# Patient Record
Sex: FEMALE | Race: OTHER | HISPANIC OR LATINO
[De-identification: names, ages, dates, MRNs, and addresses within clinical notes are randomized per-mention and may not be internally consistent; named-entity substitution may affect disease eponyms.]

---

## 2020-09-03 ENCOUNTER — RESULT CHARGE (OUTPATIENT)
Age: 35
End: 2020-09-03

## 2020-09-03 ENCOUNTER — APPOINTMENT (OUTPATIENT)
Dept: OBGYN | Facility: HOSPITAL | Age: 35
End: 2020-09-03
Payer: COMMERCIAL

## 2020-09-03 ENCOUNTER — RESULT REVIEW (OUTPATIENT)
Age: 35
End: 2020-09-03

## 2020-09-03 ENCOUNTER — OUTPATIENT (OUTPATIENT)
Dept: OUTPATIENT SERVICES | Facility: HOSPITAL | Age: 35
LOS: 1 days | End: 2020-09-03
Payer: MEDICAID

## 2020-09-03 VITALS
HEIGHT: 67 IN | WEIGHT: 175 LBS | SYSTOLIC BLOOD PRESSURE: 130 MMHG | HEART RATE: 125 BPM | BODY MASS INDEX: 27.47 KG/M2 | TEMPERATURE: 98.1 F | DIASTOLIC BLOOD PRESSURE: 72 MMHG

## 2020-09-03 DIAGNOSIS — R87.619 UNSPECIFIED ABNORMAL CYTOLOGICAL FINDINGS IN SPECIMENS FROM CERVIX UTERI: ICD-10-CM

## 2020-09-03 DIAGNOSIS — Z00.00 ENCOUNTER FOR GENERAL ADULT MEDICAL EXAMINATION W/OUT ABNORMAL FINDINGS: ICD-10-CM

## 2020-09-03 LAB
HCG UR QL: NEGATIVE
QUALITY CONTROL: YES

## 2020-09-03 PROCEDURE — 57454 BX/CURETT OF CERVIX W/SCOPE: CPT | Mod: GC

## 2020-09-03 PROCEDURE — 88305 TISSUE EXAM BY PATHOLOGIST: CPT | Mod: 26

## 2020-09-03 NOTE — PROCEDURE
[Colposcopy] : colposcopy [ASCUS] : atypical squamous cells of undetermined significance [HPV high risk] : PCR positive for high risk HPV [Patient] : patient [Risks] : risks [Alternatives] : alternatives [Infection] : infection [Benefits] : benefits [Bleeding] : bleeding [Consent Obtained] : written consent was obtained prior to the procedure [Acetowhite ___ o'clock] : ascetowhite changes at [unfilled] ~Uo'clock [Biopsies Taken: # ___] : [unfilled] biopsies taken of the cervix [No Abnormalities] : no abnormalities seen [Zoran's] : Zoran's solution [Biopsy Locations ___ o'clock] : the biopsies were taken at [unfilled] o'clock [Tolerated Well] : the patient tolerated the procedure well [No Complications] : there were no complications

## 2020-09-10 LAB — SURGICAL PATHOLOGY STUDY: SIGNIFICANT CHANGE UP

## 2020-09-17 ENCOUNTER — TRANSCRIPTION ENCOUNTER (OUTPATIENT)
Age: 35
End: 2020-09-17

## 2021-08-19 ENCOUNTER — NON-APPOINTMENT (OUTPATIENT)
Age: 36
End: 2021-08-19

## 2021-10-07 ENCOUNTER — NON-APPOINTMENT (OUTPATIENT)
Age: 36
End: 2021-10-07